# Patient Record
Sex: MALE | Race: WHITE | NOT HISPANIC OR LATINO | URBAN - METROPOLITAN AREA
[De-identification: names, ages, dates, MRNs, and addresses within clinical notes are randomized per-mention and may not be internally consistent; named-entity substitution may affect disease eponyms.]

---

## 2022-11-25 ENCOUNTER — TELEPHONE (OUTPATIENT)
Dept: UROLOGY | Facility: MEDICAL CENTER | Age: 77
End: 2022-11-25

## 2022-12-09 ENCOUNTER — OFFICE VISIT (OUTPATIENT)
Dept: UROLOGY | Facility: MEDICAL CENTER | Age: 77
End: 2022-12-09

## 2022-12-09 VITALS
HEIGHT: 70 IN | DIASTOLIC BLOOD PRESSURE: 70 MMHG | OXYGEN SATURATION: 97 % | HEART RATE: 66 BPM | WEIGHT: 219 LBS | SYSTOLIC BLOOD PRESSURE: 130 MMHG | BODY MASS INDEX: 31.35 KG/M2

## 2022-12-09 DIAGNOSIS — N40.1 BPH WITH URINARY OBSTRUCTION: ICD-10-CM

## 2022-12-09 DIAGNOSIS — R10.9 FLANK PAIN: Primary | ICD-10-CM

## 2022-12-09 DIAGNOSIS — N13.8 BPH WITH URINARY OBSTRUCTION: ICD-10-CM

## 2022-12-09 PROBLEM — I47.1 PAROXYSMAL SVT (SUPRAVENTRICULAR TACHYCARDIA) (HCC): Status: ACTIVE | Noted: 2019-06-17

## 2022-12-09 PROBLEM — I25.10 ATHEROSCLEROSIS OF NATIVE CORONARY ARTERY OF NATIVE HEART WITHOUT ANGINA PECTORIS: Status: ACTIVE | Noted: 2019-07-30

## 2022-12-09 PROBLEM — I70.0 ARTERIOSCLEROSIS OF ABDOMINAL AORTA (HCC): Status: ACTIVE | Noted: 2021-12-07

## 2022-12-09 PROBLEM — I10 ESSENTIAL HYPERTENSION: Status: ACTIVE | Noted: 2021-07-17

## 2022-12-09 PROBLEM — G31.84 AMNESTIC MCI (MILD COGNITIVE IMPAIRMENT WITH MEMORY LOSS): Status: ACTIVE | Noted: 2022-08-08

## 2022-12-09 PROBLEM — E66.09 CLASS 1 OBESITY DUE TO EXCESS CALORIES WITH SERIOUS COMORBIDITY AND BODY MASS INDEX (BMI) OF 32.0 TO 32.9 IN ADULT: Status: ACTIVE | Noted: 2017-03-30

## 2022-12-09 PROBLEM — E66.811 CLASS 1 OBESITY DUE TO EXCESS CALORIES WITH SERIOUS COMORBIDITY AND BODY MASS INDEX (BMI) OF 32.0 TO 32.9 IN ADULT: Status: ACTIVE | Noted: 2017-03-30

## 2022-12-09 PROBLEM — E78.5 DYSLIPIDEMIA: Status: ACTIVE | Noted: 2019-11-13

## 2022-12-09 PROBLEM — Z98.61 S/P PTCA (PERCUTANEOUS TRANSLUMINAL CORONARY ANGIOPLASTY): Status: ACTIVE | Noted: 2019-11-05

## 2022-12-09 PROBLEM — R73.9 HYPERGLYCEMIA: Status: ACTIVE | Noted: 2019-11-13

## 2022-12-09 PROBLEM — I47.10 PAROXYSMAL SVT (SUPRAVENTRICULAR TACHYCARDIA) (HCC): Status: ACTIVE | Noted: 2019-06-17

## 2022-12-09 PROBLEM — R09.89: Status: ACTIVE | Noted: 2022-12-07

## 2022-12-09 LAB
SL AMB  POCT GLUCOSE, UA: ABNORMAL
SL AMB LEUKOCYTE ESTERASE,UA: ABNORMAL
SL AMB POCT BILIRUBIN,UA: ABNORMAL
SL AMB POCT BLOOD,UA: ABNORMAL
SL AMB POCT CLARITY,UA: CLEAR
SL AMB POCT COLOR,UA: YELLOW
SL AMB POCT KETONES,UA: ABNORMAL
SL AMB POCT NITRITE,UA: ABNORMAL
SL AMB POCT PH,UA: 7
SL AMB POCT SPECIFIC GRAVITY,UA: 1.02
SL AMB POCT URINE PROTEIN: ABNORMAL
SL AMB POCT UROBILINOGEN: 1

## 2022-12-09 RX ORDER — NITROGLYCERIN 0.4 MG/1
0.4 TABLET SUBLINGUAL
COMMUNITY
Start: 2022-05-04 | End: 2023-05-04

## 2022-12-09 RX ORDER — FINASTERIDE 5 MG/1
5 TABLET, FILM COATED ORAL DAILY
COMMUNITY
Start: 2022-10-19

## 2022-12-09 RX ORDER — METHOCARBAMOL 500 MG/1
500 TABLET, FILM COATED ORAL 4 TIMES DAILY
COMMUNITY

## 2022-12-09 RX ORDER — MELOXICAM 15 MG/1
15 TABLET ORAL
COMMUNITY
Start: 2022-03-18 | End: 2023-03-18

## 2022-12-09 RX ORDER — METOPROLOL SUCCINATE 50 MG/1
50 TABLET, EXTENDED RELEASE ORAL DAILY
COMMUNITY

## 2022-12-09 RX ORDER — ASPIRIN 81 MG/1
81 TABLET ORAL DAILY
COMMUNITY

## 2022-12-09 RX ORDER — ROSUVASTATIN CALCIUM 20 MG/1
TABLET, COATED ORAL
COMMUNITY
Start: 2022-09-27

## 2022-12-09 RX ORDER — TAMSULOSIN HYDROCHLORIDE 0.4 MG/1
CAPSULE ORAL
COMMUNITY
Start: 2022-07-15

## 2022-12-09 RX ORDER — UBIDECARENONE 10 MG
10 CAPSULE ORAL DAILY
COMMUNITY

## 2022-12-09 RX ORDER — LATANOPROST 50 UG/ML
1 SOLUTION/ DROPS OPHTHALMIC
COMMUNITY

## 2022-12-09 NOTE — PROGRESS NOTES
HISTORY:    #1 2 years or longer of occasional right CVA and flank pain  Comes and goes at times, nothing seems to bring it on  Very mild and has not gotten worse  Because of his brother's diagnosis of kidney cancer, this patient is worried he could have a tumor also    2  Long-term BPH followed by [facility] urology  Cystoscopy 2 years ago showed moderate prostate enlargement  Recent bladder scan was 49 mL PVR  Still has nocturia x3-4, he does not think the tamsulosin helps very much anymore    Review of CT scan results November 2021 showed normal kidneys           ASSESSMENT / PLAN:    #1 I reassured him his right flank discomfort is not a sign of kidney cancer  Kidneys are normal on CT just a year ago  Renal ultrasound will be ordered    2  BPH not well handled by tamsulosin  Patient says he wants to follow here, and not [facility]    Cystoscopy will be done    Check PSA    The following portions of the patient's history were reviewed and updated as appropriate: allergies, current medications, past family history, past medical history, past social history, past surgical history and problem list     Review of Systems   All other systems reviewed and are negative  Objective:     Physical Exam  Constitutional:       Appearance: Normal appearance  Genitourinary:     Comments: Penis testes normal    Prostate mildly enlarged no nodules  Neurological:      Mental Status: He is alert  No results found for: PSA]  No results found for: BUN  No results found for: CREATININE  No components found for: CBC      There is no problem list on file for this patient  Diagnoses and all orders for this visit:    Flank pain  -     POCT urine dip auto non-scope    BPH with urinary obstruction  -     US kidney and bladder;  Future    Other orders  -     tamsulosin (FLOMAX) 0 4 mg; TAKE 1 CAPSULE BY MOUTH NIGHTLY  -     rosuvastatin (CRESTOR) 20 MG tablet; TAKE 1 TABLET BY MOUTH EVERY DAY AT NIGHT  - nitroglycerin (NITROSTAT) 0 4 mg SL tablet; Place 0 4 mg under the tongue  -     meloxicam (MOBIC) 15 mg tablet; Take 15 mg by mouth  -     latanoprost (XALATAN) 0 005 % ophthalmic solution; Apply 1 drop to eye  -     finasteride (PROSCAR) 5 mg tablet; Take 5 mg by mouth daily  -     Coenzyme Q10 10 MG capsule; Take 10 mg by mouth daily  -     Multiple Vitamin (MULTI-VITAMIN DAILY PO); Take 1 tablet by mouth daily  -     aspirin (ECOTRIN LOW STRENGTH) 81 mg EC tablet; Take 81 mg by mouth daily  -     methocarbamol (ROBAXIN) 500 mg tablet; Take 500 mg by mouth 4 (four) times a day  -     metoprolol succinate (TOPROL-XL) 50 mg 24 hr tablet; Take 50 mg by mouth daily           Patient ID: Lakesha Gong is a 65 y o  male  Current Outpatient Medications:   •  aspirin (ECOTRIN LOW STRENGTH) 81 mg EC tablet, Take 81 mg by mouth daily, Disp: , Rfl:   •  Coenzyme Q10 10 MG capsule, Take 10 mg by mouth daily, Disp: , Rfl:   •  finasteride (PROSCAR) 5 mg tablet, Take 5 mg by mouth daily, Disp: , Rfl:   •  latanoprost (XALATAN) 0 005 % ophthalmic solution, Apply 1 drop to eye, Disp: , Rfl:   •  meloxicam (MOBIC) 15 mg tablet, Take 15 mg by mouth, Disp: , Rfl:   •  methocarbamol (ROBAXIN) 500 mg tablet, Take 500 mg by mouth 4 (four) times a day, Disp: , Rfl:   •  metoprolol succinate (TOPROL-XL) 50 mg 24 hr tablet, Take 50 mg by mouth daily, Disp: , Rfl:   •  Multiple Vitamin (MULTI-VITAMIN DAILY PO), Take 1 tablet by mouth daily, Disp: , Rfl:   •  nitroglycerin (NITROSTAT) 0 4 mg SL tablet, Place 0 4 mg under the tongue, Disp: , Rfl:   •  rosuvastatin (CRESTOR) 20 MG tablet, TAKE 1 TABLET BY MOUTH EVERY DAY AT NIGHT, Disp: , Rfl:   •  tamsulosin (FLOMAX) 0 4 mg, TAKE 1 CAPSULE BY MOUTH NIGHTLY, Disp: , Rfl:     No past medical history on file  No past surgical history on file      Social History

## 2022-12-27 ENCOUNTER — HOSPITAL ENCOUNTER (OUTPATIENT)
Dept: ULTRASOUND IMAGING | Facility: MEDICAL CENTER | Age: 77
Discharge: HOME | End: 2022-12-27
Attending: UROLOGY

## 2022-12-27 DIAGNOSIS — N40.1 BPH WITH URINARY OBSTRUCTION: ICD-10-CM

## 2022-12-27 DIAGNOSIS — N13.8 BPH WITH URINARY OBSTRUCTION: ICD-10-CM

## 2023-01-03 ENCOUNTER — TELEPHONE (OUTPATIENT)
Dept: UROLOGY | Facility: MEDICAL CENTER | Age: 78
End: 2023-01-03

## 2023-01-03 NOTE — TELEPHONE ENCOUNTER
Miguel Ángel Solis [facility] [address] [place] [facility]  Please inform patient results of recent renal ultrasound were unremarkable  Attempted to contact no answer  Left voice message with providers result information per media consent advised patient he could contact our office with any questions

## 2023-08-25 ENCOUNTER — PROCEDURE VISIT (OUTPATIENT)
Dept: UROLOGY | Facility: MEDICAL CENTER | Age: 78
End: 2023-08-25
Payer: MEDICARE

## 2023-08-25 VITALS
DIASTOLIC BLOOD PRESSURE: 76 MMHG | SYSTOLIC BLOOD PRESSURE: 126 MMHG | WEIGHT: 210 LBS | HEART RATE: 64 BPM | BODY MASS INDEX: 30.13 KG/M2

## 2023-08-25 DIAGNOSIS — N40.1 BPH WITH URINARY OBSTRUCTION: Primary | ICD-10-CM

## 2023-08-25 DIAGNOSIS — R35.1 NOCTURIA: ICD-10-CM

## 2023-08-25 DIAGNOSIS — N13.8 BPH WITH URINARY OBSTRUCTION: Primary | ICD-10-CM

## 2023-08-25 PROCEDURE — 52000 CYSTOURETHROSCOPY: CPT | Performed by: UROLOGY

## 2023-08-25 PROCEDURE — 99213 OFFICE O/P EST LOW 20 MIN: CPT | Performed by: UROLOGY

## 2024-11-18 ENCOUNTER — OFFICE VISIT (OUTPATIENT)
Dept: UROLOGY | Facility: MEDICAL CENTER | Age: 79
End: 2024-11-18
Payer: MEDICARE

## 2024-11-18 VITALS
HEIGHT: 69 IN | OXYGEN SATURATION: 98 % | BODY MASS INDEX: 32.44 KG/M2 | DIASTOLIC BLOOD PRESSURE: 76 MMHG | HEART RATE: 82 BPM | WEIGHT: 219 LBS | SYSTOLIC BLOOD PRESSURE: 122 MMHG

## 2024-11-18 DIAGNOSIS — N40.1 BPH WITH URINARY OBSTRUCTION: Primary | ICD-10-CM

## 2024-11-18 DIAGNOSIS — N13.8 BPH WITH URINARY OBSTRUCTION: Primary | ICD-10-CM

## 2024-11-18 PROCEDURE — 99213 OFFICE O/P EST LOW 20 MIN: CPT

## 2024-11-18 RX ORDER — TAMSULOSIN HYDROCHLORIDE 0.4 MG/1
0.4 CAPSULE ORAL
Qty: 90 CAPSULE | Refills: 3 | Status: SHIPPED | OUTPATIENT
Start: 2024-11-18

## 2024-11-18 RX ORDER — MUPIROCIN 20 MG/G
OINTMENT TOPICAL
COMMUNITY
Start: 2024-11-14

## 2024-11-18 RX ORDER — ROSUVASTATIN CALCIUM 40 MG/1
40 TABLET, COATED ORAL
COMMUNITY
Start: 2024-11-05

## 2024-11-18 RX ORDER — FINASTERIDE 5 MG/1
5 TABLET, FILM COATED ORAL DAILY
Qty: 90 TABLET | Refills: 3 | Status: SHIPPED | OUTPATIENT
Start: 2024-11-18

## 2024-11-18 NOTE — PROGRESS NOTES
11/18/2024      Chief Complaint   Patient presents with    Follow-up     Assessment and Plan    79 y.o. male managed by Dr. Pierce     1. BPH with urinary obstruction  Assessment & Plan:  Continues taking tamsulosin and finasteride for LUTS   Tolerating medication well   Patient continues with nocturia 1-3 times per night  Patient reports being content with current urination at this time   Denies dsyuira or gross hematuria   Discussed importance of adequate hydration  Discussed bladder irritants  Aware to call if any changes or concerns with urination   Reviewed cystoscopy/TRUS if symptoms worsen  Plan to follow up in 1 year   Orders:  -     finasteride (PROSCAR) 5 mg tablet; Take 1 tablet (5 mg total) by mouth daily  -     tamsulosin (FLOMAX) 0.4 mg; Take 1 capsule (0.4 mg total) by mouth daily with dinner    No need to continue with prostate cancer screening given age and PSA stability.    Most recent PSA 10/2024 -1.39.    History of Present Illness  Melquiades Perez is a 79 y.o. male here for evaluation of BPH.  Patient continues on tamsulosin and finasteride for lower urinary tract symptoms.  Patient reports being content with urination at this time.  He feels that he has been emptying his bladder well.  He mainly reports nocturia 1-3 times per night.  He reports having dinner around 830 and having 1 glass of wine at nighttime.  He denies symptoms of urinary tract infection or gross hematuria at this time.  Brother with a history of kidney cancer post nephrectomy now.  Was being followed by [facility].  Ultrasound was done 2022 which was normal.  Patient then had an ultrasound done at [facility] in September of this year for renal cysts.  Impression bilateral simple renal cysts.  No follow-up warranted patient is asymptomatic.      Also follows with [facility] urology     Review of Systems   Constitutional:  Negative for chills and fever.   HENT:  Negative for ear pain and sore throat.    Eyes:  Negative for pain and visual  "disturbance.   Respiratory:  Negative for cough and shortness of breath.    Cardiovascular:  Negative for chest pain and palpitations.   Gastrointestinal:  Negative for abdominal pain and vomiting.   Genitourinary:  Positive for frequency (nocturia). Negative for decreased urine volume, difficulty urinating, dysuria, flank pain, hematuria and urgency.   Musculoskeletal:  Negative for arthralgias and back pain.   Skin:  Negative for color change and rash.   Neurological:  Negative for seizures and syncope.   All other systems reviewed and are negative.      AUA SYMPTOM SCORE      Flowsheet Row Most Recent Value   AUA SYMPTOM SCORE    How often have you had a sensation of not emptying your bladder completely after you finished urinating? 0 (P)     How often have you had to urinate again less than two hours after you finished urinating? 1 (P)     How often have you found you stopped and started again several times when you urinate? 0 (P)     How often have you found it difficult to postpone urination? 2 (P)     How often have you had a weak urinary stream? 1 (P)     How often have you had to push or strain to begin urination? 0 (P)     How many times did you most typically get up to urinate from the time you went to bed at night until the time you got up in the morning? 5 (P)     Quality of Life: If you were to spend the rest of your life with your urinary condition just the way it is now, how would you feel about that? 2 (P)     AUA SYMPTOM SCORE 9 (P)             Vitals  Vitals:    11/18/24 0822   BP: 122/76   BP Location: Left arm   Patient Position: Sitting   Cuff Size: Standard   Pulse: 82   SpO2: 98%   Weight: 99.3 kg (219 lb)   Height: 5' 9\" (1.753 m)       Physical Exam  Vitals reviewed.   Constitutional:       Appearance: Normal appearance.   HENT:      Head: Normocephalic and atraumatic.   Eyes:      Conjunctiva/sclera: Conjunctivae normal.   Pulmonary:      Effort: Pulmonary effort is normal.   Abdominal:    "   General: Abdomen is flat. There is no distension.      Palpations: Abdomen is soft.      Tenderness: There is no abdominal tenderness.   Musculoskeletal:         General: Normal range of motion.      Cervical back: Normal range of motion.   Skin:     General: Skin is warm and dry.   Neurological:      General: No focal deficit present.      Mental Status: He is alert and oriented to person, place, and time.   Psychiatric:         Mood and Affect: Mood normal.         Behavior: Behavior normal.         Thought Content: Thought content normal.         Judgment: Judgment normal.       Past History  History reviewed. No pertinent past medical history.  Social History     Socioeconomic History    Marital status: /Civil Union     Spouse name: None    Number of children: None    Years of education: None    Highest education level: None   Occupational History    None   Tobacco Use    Smoking status: Some Days     Types: Cigarettes    Smokeless tobacco: Never   Vaping Use    Vaping status: Never Used   Substance and Sexual Activity    Alcohol use: Yes     Comment: OCC    Drug use: Not Currently    Sexual activity: None   Other Topics Concern    None   Social History Narrative    None     Social Drivers of Health     Financial Resource Strain: Not on file   Food Insecurity: Not on file   Transportation Needs: Not on file   Physical Activity: Not on file   Stress: Not on file   Social Connections: Not on file   Intimate Partner Violence: Not on file   Housing Stability: Not on file     Social History     Tobacco Use   Smoking Status Some Days    Types: Cigarettes   Smokeless Tobacco Never     History reviewed. No pertinent family history.    The following portions of the patient's history were reviewed and updated as appropriate: allergies, current medications, past medical history, past social history, past surgical history and problem list.    Results  No results found for this or any previous visit (from the past  "hour).]  No results found for: \"PSA\"  Lab Results   Component Value Date    CALCIUM 9.4 11/07/2023    K 4.2 11/07/2023    CO2 29 11/07/2023     11/07/2023    BUN 22 11/07/2023    CREATININE 0.89 11/07/2023     No results found for: \"WBC\", \"HGB\", \"HCT\", \"MCV\", \"PLT\"    "

## 2024-11-18 NOTE — ASSESSMENT & PLAN NOTE
Continues taking tamsulosin and finasteride for LUTS   Tolerating medication well   Patient continues with nocturia 1-3 times per night  Patient reports being content with current urination at this time   Denies dsyuira or gross hematuria   Discussed importance of adequate hydration  Discussed bladder irritants  Aware to call if any changes or concerns with urination   Reviewed cystoscopy/TRUS if symptoms worsen  Plan to follow up in 1 year